# Patient Record
Sex: MALE | Race: WHITE | Employment: FULL TIME | URBAN - METROPOLITAN AREA
[De-identification: names, ages, dates, MRNs, and addresses within clinical notes are randomized per-mention and may not be internally consistent; named-entity substitution may affect disease eponyms.]

---

## 2023-02-24 DIAGNOSIS — M25.471 PAIN AND SWELLING OF RIGHT ANKLE: Primary | ICD-10-CM

## 2023-02-24 DIAGNOSIS — M25.571 PAIN AND SWELLING OF RIGHT ANKLE: Primary | ICD-10-CM

## 2023-02-28 ENCOUNTER — OFFICE VISIT (OUTPATIENT)
Dept: ORTHOPEDIC SURGERY | Age: 33
End: 2023-02-28

## 2023-02-28 VITALS — WEIGHT: 315 LBS | HEIGHT: 77 IN | TEMPERATURE: 98 F | BODY MASS INDEX: 37.19 KG/M2

## 2023-02-28 DIAGNOSIS — S93.491A SPRAIN OF OTHER LIGAMENT OF RIGHT ANKLE, INITIAL ENCOUNTER: Primary | ICD-10-CM

## 2023-02-28 RX ORDER — SERTRALINE HYDROCHLORIDE 100 MG/1
TABLET, FILM COATED ORAL
COMMUNITY
Start: 2023-01-30

## 2023-02-28 RX ORDER — LOSARTAN POTASSIUM AND HYDROCHLOROTHIAZIDE 12.5; 1 MG/1; MG/1
TABLET ORAL
COMMUNITY
Start: 2022-12-08

## 2023-02-28 SDOH — HEALTH STABILITY: PHYSICAL HEALTH: ON AVERAGE, HOW MANY DAYS PER WEEK DO YOU ENGAGE IN MODERATE TO STRENUOUS EXERCISE (LIKE A BRISK WALK)?: 6 DAYS

## 2023-02-28 SDOH — HEALTH STABILITY: PHYSICAL HEALTH: ON AVERAGE, HOW MANY MINUTES DO YOU ENGAGE IN EXERCISE AT THIS LEVEL?: 150+ MIN

## 2023-02-28 NOTE — PROGRESS NOTES
Chief Complaint   Patient presents with    Ankle Pain     Right ankle/foot injury 1/20/23. Was at work and stepped off a fork lift and rolled it. Molly Izaguirre is a 28 y.o.  male who presents today with a right ankle injury. The injury occurred 1/20/23. The history of injury was from at work. The patient complains of pain over lateral ankle. The intensity is 4/10. The pain is described as: sharp. Previous treatment includes: boot,crutches,has been weight bearing. No past medical history on file. No past surgical history on file. Current Outpatient Medications:     losartan-hydroCHLOROthiazide (HYZAAR) 100-12.5 MG per tablet, TAKE 1 TABLET BY MOUTH EVERY DAY, Disp: , Rfl:     sertraline (ZOLOFT) 100 MG tablet, TAKE 1 TABLET BY MOUTH EVERY DAY, Disp: , Rfl:   No Known Allergies  Social History     Socioeconomic History    Marital status:      Spouse name: Not on file    Number of children: Not on file    Years of education: Not on file    Highest education level: Not on file   Occupational History    Not on file   Tobacco Use    Smoking status: Not on file    Smokeless tobacco: Not on file   Substance and Sexual Activity    Alcohol use: Not on file    Drug use: Not on file    Sexual activity: Not on file   Other Topics Concern    Not on file   Social History Narrative    Not on file     Social Determinants of Health     Financial Resource Strain: Not on file   Food Insecurity: Not on file   Transportation Needs: Not on file   Physical Activity: Sufficiently Active    Days of Exercise per Week: 6 days    Minutes of Exercise per Session: 150+ min   Stress: Not on file   Social Connections: Not on file   Intimate Partner Violence: Not At Risk    Fear of Current or Ex-Partner: No    Emotionally Abused: No    Physically Abused: No    Sexually Abused: No   Housing Stability: Not on file     No family history on file.     REVIEW OF SYSTEMS:     General/Constitution:  (-)weight loss, (-)fever, (-)chills, (-)weakness. Skin: (-) rash,(-) psoriasis,(-) eczema, (-)skin cancer. Musculoskeletal: (-) fractures,  (-) dislocations,(-) collagen vascular disease, (-) fibromyalgia, (-) multiple sclerosis, (-) muscular dystrophy, (-) RSD,(-) joint pain (-)swelling, (-) joint pain,swelling. Neurologic: (-) epilepsy, (-)seizures,(-) brain tumor,(-) TIA, (-)stroke, (-)headaches, (-)Parkinson disease,(-) memory loss, (-) LOC. Cardiovascular: (-) Chest pain, (-) swelling in legs/feet, (-) SOB, (-) cramping in legs/feet with walking. Respiratory: (-) SOB, (-) Coughing, (-) night sweats. GI: (-) nausea, (-) vomiting, (-) diarrhea, (-) blood in stool, (-) gastric ulcer. Psychiatric: (-) Depression, (-) Anxiety, (-) bipolar disease, (-) Alzheimer's Disease  Allergic/Immunologic: (-) allergies latex, (-) allergies metal, (-) skin sensitivity. Hematlogic: (-) anemia, (-) blood transfusion, (-) DVT/PE, (-) Clotting disorders      EXAM:      Constitution:    Temp 98 °F (36.7 °C)   Ht 6' 5\" (1.956 m)   Wt (!) 350 lb (158.8 kg)   BMI 41.50 kg/m²       Psycihatric:    The patient is alert and oriented x 3, appears to be stated age and in no distress. Respiratory:    Respiratory effort is not labored. Patient is not gasping. Palpation of the chest reveals no tactile fremitus. Skin:    Upon inspection: the skin appears warm, dry and intact. There is not a previous scar over the affected area. There is not any cellulitis, lymphedema or cutaneous lesions noted in the lower extremities. Upon palpation there is no induration noted. Neurologic:      Motor exam of the lower extremities show ; quadriceps, hamstrings, foot dorsi and plantar flexors intact R.  5/5 and L. 5/5. Deep tendon reflexes are 2/4 at the knees and 2/4 at the ankles with strong extensor hallicus longus motor strength bilaterally. Sensory to both feet is intact to all sensory roots. Cardiovascular:     The vascular exam is normal and is well perfused to distal extremities. Distal pulses DP/PT: R. 2+; L. 2+. There is cap refill noted less than two seconds in all digits. There is not edema of the bilateral lower extremities. There is not varicosities noted in the distal extremities. Lymph:    Upon palpation,  there is no lymphadenopathy noted in bilateral lower extremities. Musculoskeletal:    Gait: antalgic; examination of the nails and digits reveal no cyanosis or clubbing. Knee exam - bilateral knee exam shows;  range of motion of R. Knee is 0 to 120, and L. Knee is 0 to 120. The patient does not have  pain on motion, there is not an effusion, there is not tenderness over the  medial, lateral, anterior region, there are not any masses, there is not ligamentous instability, there is not  deformity noted. Knee exam: the injured knee reveals normal exam, no swelling, tenderness, instability; ligaments intact, FROM. Knee exam: neither positive for moderate crepitations, some mild tenderness laxity is not noted with  stress. Ankle Exam:    Upon inspection and palpation of the Right ankle,  there is  deformity noted,  moderate swelling, no ecchymosis, has pain on palpation of ATFL,CFL. ROM R/L : DF 5/15; PF 30/35;  INV 15/15, NEREIDA 15/15. This exam was compared bilaterally. Right Ankle:   (-) Anterior Drawer ,  (-) Posterior Drawer ,  (-) Squeeze test,  (-) External Rotation, (-) Eversion test , (-) Cueto Test     Left ankle:   (-) Anterior Drawer ,(-)  Posterior Drawer ,(-) Squeeze test,(-) External Rotation (-) Eversion test, (-) Cueto Test.      Foot exam- visual inspection reveals warm, good capillary refill, there is not pain to palpation over the foot. ROM inversion/eversion full range of motion, abduction/adduction full range of motion, ROM in MTP/PIP/DIP full range of motion. XR: no fracture or dislocation      Impression:  Encounter Diagnosis   Name Primary?     Sprain of other ligament of right ankle, initial encounter Yes         Plan:Natural history and expected course discussed. Questions answered. Rest, ice, compression, elevation (RICE) therapy. Crutches and instructions provided. Educational materials distributed.   C9 for PT and ASO  Wbat  Conservative treatment   Fu 4-6 weeks  Ok to continue working from home

## 2023-03-15 ENCOUNTER — EVALUATION (OUTPATIENT)
Dept: PHYSICAL THERAPY | Age: 33
End: 2023-03-15

## 2023-03-15 DIAGNOSIS — S93.401A SPRAIN OF RIGHT ANKLE, UNSPECIFIED LIGAMENT, INITIAL ENCOUNTER: Primary | ICD-10-CM

## 2023-03-15 NOTE — PROGRESS NOTES
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 Pain returns to baseline in  10 minutes  Pain frequency: intermittent    Symptom Type / Quality: dull, aching  Location[de-identified] Ankle: lateral side and posterior side, over ATF and just posterior to lateral malleoli    Aggravated by: walking, stairs    Relieved by: rest, reduced weightbearing    Imaging results: XR ANKLE RIGHT (MIN 3 VIEWS)    Result Date: 2023  EXAMINATION: THREE XRAY VIEWS OF THE RIGHT ANKLE; THREE XRAY VIEWS OF THE RIGHT FOOT 2023 11:39 am COMPARISON: None. HISTORY: ORDERING SYSTEM PROVIDED HISTORY: Pain and swelling of right ankle FINDINGS: Right ankle: Radiographs of the right ankle demonstrate no acute fractures with preserved alignment. Sequela of prior trauma distal to the right fibula and medial malleolus. Ankle mortise is maintained. Normal appearance of the talar dome. Anterior and posterior calcaneal enthesophytes. Osseous mineralization is normal.  Lateral malleolar soft tissue swelling and small joint effusion. Right foot: Radiographs of the right foot demonstrate no fractures with preserved alignment. There are no erosive changes. No degenerative spurring.   Osseous mineralization is normal.  There is no soft tissue swelling. 1.  No acute osseous findings seen about the right ankle nor right foot. Lateral malleolar soft tissue swelling and small joint effusion. 2.  Sequela of prior trauma distal to the medial and lateral malleolus. Noted calcaneal enthesophytes. RECOMMENDATION: In the setting of recent trauma, if there is persistent symptoms and physical exam warrants a repeat radiograph in 10-14 days could be considered as occult fractures may not be evident on initial imaging evaluation. XR FOOT RIGHT (MIN 3 VIEWS)    Result Date: 2/28/2023  EXAMINATION: THREE XRAY VIEWS OF THE RIGHT ANKLE; THREE XRAY VIEWS OF THE RIGHT FOOT 2/28/2023 11:39 am COMPARISON: None. HISTORY: ORDERING SYSTEM PROVIDED HISTORY: Pain and swelling of right ankle FINDINGS: Right ankle: Radiographs of the right ankle demonstrate no acute fractures with preserved alignment. Sequela of prior trauma distal to the right fibula and medial malleolus. Ankle mortise is maintained. Normal appearance of the talar dome. Anterior and posterior calcaneal enthesophytes. Osseous mineralization is normal.  Lateral malleolar soft tissue swelling and small joint effusion. Right foot: Radiographs of the right foot demonstrate no fractures with preserved alignment. There are no erosive changes. No degenerative spurring. Osseous mineralization is normal.  There is no soft tissue swelling. 1.  No acute osseous findings seen about the right ankle nor right foot. Lateral malleolar soft tissue swelling and small joint effusion. 2.  Sequela of prior trauma distal to the medial and lateral malleolus. Noted calcaneal enthesophytes. RECOMMENDATION: In the setting of recent trauma, if there is persistent symptoms and physical exam warrants a repeat radiograph in 10-14 days could be considered as occult fractures may not be evident on initial imaging evaluation. Past Medical History:  No past medical history on file.   No past surgical history on file.    Medications:   Current Outpatient Medications   Medication Sig Dispense Refill    losartan-hydroCHLOROthiazide (HYZAAR) 100-12.5 MG per tablet TAKE 1 TABLET BY MOUTH EVERY DAY      sertraline (ZOLOFT) 100 MG tablet TAKE 1 TABLET BY MOUTH EVERY DAY       No current facility-administered medications for this visit.       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 Physical demands include: lifting, carrying, pushing, pulling heavy weighted items (50 - 100 lbs Occasionally), walking.  Status: full time 8hr shifts/6 days week    Exercise regimen: none    Hobbies: golfing    Patient Goals: full use of leg, get back to normal, return to work    Precautions/Contraindications: none    OBJECTIVE:     Estimated body mass index is 41.5 kg/m² as calculated from the following:    Height as of 2/28/23: 6' 5\" (1.956 m).    Weight as of 2/28/23: 350 lb (158.8 kg).     Observations: well nourished male    Inspection: normal orthopedic exam    Edema: marked  lateral maleolli  over ATF    Gait: antalgic gait, wide-based    Joint/Motion:    Ankle:  Right:   AROM: 0° Dorsiflexion,  30° Plantarflexion, 25° Inversion, 10° Eversion   PROM: 8° Dorsiflexion,  35° Plantarflexion, 30° Inversion, 15° Eversion   Left:   AROM: WNL  PROM: WNL    Strength:    Ankle:  Right: Dorsiflexion 5-/5, Plantarflexion 5-/5, Inversion 4/5, Eversion 4/5  Left: Dorsiflexion 5/5, Plantarflexion 5/5, Inversion 5/5, Eversion 5/5    Palpation: Tender to palpation anterior talofibular ligament , posterior talofibular ligament .    Special Tests/Functional Screens:    [x] Anterior Drawer []+ / [x] -  [x] Eversion Stress: []+ / [x] -  [] Cueto Test []+ / [] -     [] Tib-Fib Compression Test []+ / [] -    [x] Inversion Stress []+ / [x] -     [] Squeeze Test []+ / [] -   [x] Benoit's Sign []+ / [x] -   [] Other: []+ / []      Special test comments:       ASSESSMENT     Phillip sustained inversion ankle sprain 1-. He was in a boot and ambulated NWB w/ crutches. He is  improving and ambulates w/ slight antalgic gait w/ excessive inversion on the R vs L. He does have decreased ROM also. We will initiate a ROM, stretching, strengthening, and proprioceptive program.    Outcome Measure:   Lower Extremity Functional Scale (LEFS) 49% impairment    Problems:   Pain 10/10 intermittent  Edema marked  ROM decreased  Strength decreased   Limitations with use of right lower extremity, walking, stairs, work      [x] There are no barriers affecting plan of care or recovery    [] Barriers to this patient's plan of care or recovery include:     Domestic Concerns:  [x] No  [] Yes:    Short Term goals (2-3 weeks)  Pain 5/10 intermittent  Edema abolished  Able to perform / complete the following functions / tasks: normal gait    Long Term goals (4-6 weeks)  Pain 1/10 infrequent  ROM symmetrical  Strength 5/5  Able to perform / complete the following functions / tasks: normal gait, normal stair negotiation , return to exercise regimen   LEFS 15% impairment  Independent with home exercise program (HEP)    Rehab Potential: [x] Good  [] Fair  [] Poor    PLAN       Physical therapy plan of care is established based on physician order, patient diagnosis, and clinical assessment.     Treatment Plan:   instruction in home exercise program   therapeutic exercise   therapeutic activity   neuromuscular re-education   gait training   proprioceptive training   soft tissue mobilization   vasocompression     The following CPT codes are likely to be used in the care of this patient:   93768 PT Evaluation: Moderate Complexity   97233 Therapeutic Exercise   36376 Neuromuscular Re-Education   32352 Therapeutic Activities   22548 Manual Therapy   66478 Gait Training   44043 Vasopneumatic Device     Suggested Professional Referral: [x] No  [] Yes:     Patient Education:  [x] Plans / Goals, Risks / Benefits discussed  [x] Home exercise program  Method of Education: [x] Verbal  [x] Demo  [x] Written  Comprehension of Education:  [x] Verbalizes understanding. [x] Demonstrates understanding. [] Needs Review. [] Demonstrates / verbalizes understanding of HEP/Ed previously given. Frequency: 1-2 days per week for 4-6 weeks    Patient understands diagnosis/prognosis and consents to treatment, plan and goals: [x] Yes    [] No     Thank you for the opportunity to work with your patient. If you have questions or comments, please contact me at [phone]; fax: [phone]. Electronically signed by: Lizz Yanez PT         Please sign Physician's Certification and return to: [address] PHYSICAL THERAPY  [address] [facility] [address]  Dept: [phone]  Dept Fax: [phone]  Loc: [phone] Certification / Comments     Frequency/Duration 1-2 days per week for 4-6 weeks. Certification period from 3/15/2023 to 6/10/2023. I have reviewed the Plan of Care established for skilled therapy services and certify that the services are required and that they will be provided while the patient is under my care.     Physician's Comments/Revisions:               Physician's Printed Name:                                           [de-identified] Signature:                                                               Date:

## 2023-03-15 NOTE — PROGRESS NOTES
Physical Therapy Daily Treatment Note    Date: 3/15/2023  Patient Name: Adi Jo  : 1990   MRN: [id number]  DOInjury: 2023   DOSx: -  Referring Provider: Nancy Glass, [address] [place] [address]     Medical Diagnosis:     J71.573N (ICD-10-CM) - Sprain of unspecified ligament of right ankle, initial encounter    Outcome Measure:  LEFS 49% impairment    Vernadine Baumgarten sustained inversion ankle sprain 2023. He was in a boot and ambulated NWB w/ crutches. He is improving and ambulates w/ slight antalgic gait w/ excessive inversion on the R vs L. He does have decreased ROM also. We will initiate a ROM, stretching, strengthening, and proprioceptive program.    X = TO BE PERFORMED NEXT VISIT  > = PROGRESS TO THIS    S: See eval  O: Discussed anatomy, physiology, body mechanics, principles of loading, and progressive loading/activity. Reviewed home exercise program extensively; written copy provided. Time 9547-5424     Visit 1 Repeat outcome measure at mid point and end. Pain Pain with activity 3/10     ROM Limited dorsiflexion and plantarflexion     Modalities         MO   Manual            Stretch      Towel / strap DF, INV, EV   TE      TE   Exercise      Ankle pumps 2 x 50  TE   Ankle circles CW/CCW 2 x 50        TE   QS   TE   SLR   TE   SAQ   TE   [] TG  [] Leg Press 2-leg   TE   [] TG  [] Leg Press 1-leg   TE   Hamstring Curl Machine   TE   Knee Extension Machine   TE   Step-ups - FWD   TA   Step-ups - LAT   TA   Step-ups - BWD   TA   Calf Raises   TA      TA      TA               A:  Tolerated well.     P: Continue with rehab plan  Yane Morris PT    Treatment Charges: Mins Units   Initial Evaluation 30 1   Re-Evaluation     Ther Exercise         TE 15 1   Manual Therapy     MT     Ther Activities        TA     Gait Training          GT     Neuro Re-education NR     Modalities     Non-Billable Service Time     Other     Total Time/Units 45 2

## 2023-03-16 PROBLEM — S93.401A SPRAIN OF RIGHT ANKLE: Status: ACTIVE | Noted: 2023-03-16

## 2023-03-20 ENCOUNTER — TREATMENT (OUTPATIENT)
Dept: PHYSICAL THERAPY | Age: 33
End: 2023-03-20
Payer: COMMERCIAL

## 2023-03-20 DIAGNOSIS — S93.401A SPRAIN OF RIGHT ANKLE, UNSPECIFIED LIGAMENT, INITIAL ENCOUNTER: Primary | ICD-10-CM

## 2023-03-20 PROCEDURE — 97530 THERAPEUTIC ACTIVITIES: CPT

## 2023-03-20 NOTE — PROGRESS NOTES
Physical Therapy Daily Treatment Note    Date: 3/20/2023  Patient Name: Judy Aragon  : 1990   MRN: [id number]  DOInjury: 23   DOSx: n/a  Referring Provider: Leslye Mann DO   [address]  [facility]     Medical Diagnosis:      Diagnosis Orders   1. Sprain of right ankle, unspecified ligament, initial encounter            Outcome Measure:  LEFS 49% impairment    X = TO BE PERFORMED NEXT VISIT  > = PROGRESS TO THIS    S: Pt reports no pain at rest but 3-4/10 during activity. O: Discussed anatomy, physiology, body mechanics, principles of loading, and progressive loading/activity. Reviewed home exercise program extensively; written copy provided. Time 8946-9660     Visit 2/ Repeat outcome measure at mid point and end. Pain Pain at rest 0/10  Pain with activity 4/10     ROM      Modalities         MO   Manual            Stretch      Towel / strap DF, INV, EV   TE      TE   Exercise      BAPS L3 x 20 1 foot on board  L3 x 20 2 feet on board     Ball / can rolling   TE   Toe curls      Heel slides   TE   QS   TE   SLR   TE   SAQ   TE   [] TG  [] Leg Press 2-leg   TE   [] TG  [] Leg Press 1-leg   TE   Hamstring Curl Machine   TE   Knee Extension Machine   TE   Step-ups - FWD 7\" x 20  TA   Step-ups - LAT 7\" x 20  TA   Step-ups - BWD   TA   Calf Raises 3 x 10  TA      TA   Marching 2 x 50 ft  TA   Side marching 2 x 50ft           A:  Tolerated well.     P: Continue with rehab plan  Yossi Mcghee PTA    Treatment Charges: Mins Units   Initial Evaluation     Re-Evaluation     Ther Exercise         TE     Manual Therapy     MT     Ther Activities        TA 35 2   Gait Training          GT     Neuro Re-education NR     Modalities     Non-Billable Service Time     Other     Total Time/Units 35 2

## 2023-03-22 ENCOUNTER — TREATMENT (OUTPATIENT)
Dept: PHYSICAL THERAPY | Age: 33
End: 2023-03-22

## 2023-03-22 DIAGNOSIS — S93.401A SPRAIN OF RIGHT ANKLE, UNSPECIFIED LIGAMENT, INITIAL ENCOUNTER: Primary | ICD-10-CM

## 2023-03-22 NOTE — PROGRESS NOTES
Physical Therapy Daily Treatment Note    Date: 3/22/2023  Patient Name: Nga Manley  : 1990   MRN: [id number]  DOInjury: 23   DOSx: n/a  Referring Provider: Saranya Hare DO   [address]  [facility]     Medical Diagnosis:      Diagnosis Orders   1. Sprain of right ankle, unspecified ligament, initial encounter              Outcome Measure:  LEFS 49% impairment    X = TO BE PERFORMED NEXT VISIT  > = PROGRESS TO THIS    S: Pt reports overall tightness in ankle, soreness. Pt reports increased pain following last session but subsided by late evening. 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 O: Moderate swelling noted in right lateral ankle  Time 0438-6766     Visit 3/ Repeat outcome measure at mid point and end. Pain Pain at rest 0/10  Pain with activity 4/10     ROM      Modalities         MO   Manual            Stretch      Towel / strap DF, INV, EV   TE   Gastroc wall stretch 3 x 30 sec     Soleus wall stretch 3 x 30 sec     Plantar flexion wall stretch 3 x 30 sec  TE   Exercise      Treadmill 1.0 mph x 5 min     BAPS L3 2 x 20   2 feet on board Walk between sets    Ball / can rolling   TE   Toe curls      Heel slides   TE   QS   TE   SLR   TE   SAQ   TE   [] TG  [] Leg Press 2-leg   TE   [] TG  [] Leg Press 1-leg   TE   Hamstring Curl Machine   TE   Knee Extension Machine   TE   Step-ups - FWD  TA   Step-ups - LAT  TA   Step-ups - BWD   TA   Calf Raises  TA      TA   Marching 4 x 50 ft  TA   Side marching           A: Tolerated well. Felt better following PT. Decreased discomfort and tightness.   P: Continue with rehab plan  Manav Osuna PTA    Treatment Charges: Mins Units   Initial Evaluation     Re-Evaluation     Ther Exercise         TE 25 2   Manual Therapy     MT     Ther Activities        TA 15 1   Gait Training          GT     Neuro Re-education NR     Modalities     Non-Billable Service Time

## 2023-03-24 ENCOUNTER — TREATMENT (OUTPATIENT)
Dept: PHYSICAL THERAPY | Age: 33
End: 2023-03-24

## 2023-03-24 DIAGNOSIS — S93.401A SPRAIN OF RIGHT ANKLE, UNSPECIFIED LIGAMENT, INITIAL ENCOUNTER: Primary | ICD-10-CM

## 2023-03-28 ENCOUNTER — OFFICE VISIT (OUTPATIENT)
Dept: ORTHOPEDIC SURGERY | Age: 33
End: 2023-03-28
Payer: COMMERCIAL

## 2023-03-28 ENCOUNTER — TREATMENT (OUTPATIENT)
Dept: PHYSICAL THERAPY | Age: 33
End: 2023-03-28

## 2023-03-28 VITALS — WEIGHT: 315 LBS | TEMPERATURE: 98 F | HEIGHT: 77 IN | BODY MASS INDEX: 37.19 KG/M2

## 2023-03-28 DIAGNOSIS — S93.491A SPRAIN OF OTHER LIGAMENT OF RIGHT ANKLE, INITIAL ENCOUNTER: Primary | ICD-10-CM

## 2023-03-28 DIAGNOSIS — S93.401A SPRAIN OF RIGHT ANKLE, UNSPECIFIED LIGAMENT, INITIAL ENCOUNTER: Primary | ICD-10-CM

## 2023-03-28 PROCEDURE — 99213 OFFICE O/P EST LOW 20 MIN: CPT | Performed by: ORTHOPAEDIC SURGERY

## 2023-03-28 RX ORDER — CELECOXIB 200 MG/1
200 CAPSULE ORAL DAILY
Qty: 30 CAPSULE | Refills: 3 | Status: SHIPPED | OUTPATIENT
Start: 2023-03-28 | End: 2023-07-26

## 2023-03-28 NOTE — PROGRESS NOTES
Physical Therapy Daily Treatment Note    Date: 3/28/2023  Patient Name: Benito Moffett  : 1990   MRN: [id number]  DOInjury: 23   DOSx: n/a  Referring Provider: Arturo Ashton DO   [address]  [facility]     Medical Diagnosis:      Diagnosis Orders   1. Sprain of right ankle, unspecified ligament, initial encounter          Outcome Measure:  LEFS 49% impairment    LEFS 49% impairment (3/28/23)    X = TO BE PERFORMED NEXT VISIT  > = PROGRESS TO THIS    S: Pt reports consistent 3-4/10 pain and tightness. Does increase with activity  O: Moderate swelling noted in right lateral ankle  Time 0608-7066     Visit   Claim # [id number]  Dx: J79.922R  DOI: 2023  Wc approved 18 visits through 2023 Repeat outcome measure at mid point and end. Pain Pain with activity 5/10  Pain 3-4/10 at rest     ROM      Modalities         MO   Manual            Stretch      Towel / strap DF, INV, EV   TE   Gastroc wall stretch 3 x 30 sec     Soleus wall stretch 3 x 30 sec     Plantar flexion wall stretch 3 x 30 sec  TE   Stretch on step gastroc  3 x 30 sec     Exercise      Treadmill 1.9 mph x 5 min     BAPS L3 2 x 20   2 feet on board Walk between sets    Ball / can rolling   TE   Toe curls      Heel slides   TE   QS   TE   SLR   TE   SAQ   TE   [] TG  [] Leg Press 2-leg   TE   [] TG  [] Leg Press 1-leg   TE   Hamstring Curl Machine   TE   Knee Extension Machine   TE   Step-ups - FWD 7\" x 10  TA   Step-ups - LAT 7\" x 10  TA   Step-ups - BWD   TA   Calf Raises  TA   Slant board  TA   prostretch     Marching 4 x 50 ft  TA   Side marching           A: Tolerated well.  LEFs outcome measure remains the same  P: Continue with rehab plan  Marlee Holden PTA    Treatment Charges: Mins Units   Initial Evaluation     Re-Evaluation     Ther Exercise         TE 20 1   Manual Therapy     MT     Ther Activities        TA 20 1   Gait Training          GT     Neuro Re-education NR     Modalities     Non-Billable

## 2023-03-28 NOTE — PROGRESS NOTES
instructions provided. Educational materials distributed. He will continue with PT. I will give him an ASO and see him back 5 weeks.   He will continue to work from home until next orthopedic evaluation on 05/02/2023

## 2023-03-29 ENCOUNTER — TREATMENT (OUTPATIENT)
Dept: PHYSICAL THERAPY | Age: 33
End: 2023-03-29
Payer: COMMERCIAL

## 2023-03-29 DIAGNOSIS — S93.401A SPRAIN OF RIGHT ANKLE, UNSPECIFIED LIGAMENT, INITIAL ENCOUNTER: Primary | ICD-10-CM

## 2023-03-29 PROCEDURE — 97530 THERAPEUTIC ACTIVITIES: CPT

## 2023-03-29 PROCEDURE — 97110 THERAPEUTIC EXERCISES: CPT

## 2023-03-29 NOTE — PROGRESS NOTES
Physical Therapy Daily Treatment Note    Date: 3/29/2023  Patient Name: Azam Mustafa  : 1990   MRN: [id number]  DOInjury: 23   DOSx: n/a  Referring Provider: Eloy Boeck, DO   [address]  [facility]     Medical Diagnosis:      Diagnosis Orders   1. Sprain of right ankle, unspecified ligament, initial encounter            Outcome Measure:  LEFS 49% impairment    LEFS 49% impairment (3/28/23)    X = TO BE PERFORMED NEXT VISIT  > = PROGRESS TO THIS    S: Pt states when he stretches ankle feels better/looser but increased activity tightens it right back up. O: Moderate swelling noted in right lateral ankle. Noted decreased antalgic gait this session  Time      Visit   Claim # [id number]  Dx: S36.062X  DOI: 2023  Wc approved 18 visits through 2023 Repeat outcome measure at mid point and end. Pain Pain with activity 5/10  Pain 3-4/10 at rest     ROM      Modalities         MO   Manual            Stretch      Towel / strap DF, INV, EV   TE   Gastroc wall stretch     Soleus step stretch 3 x 30 sec     Plantar flexion wall stretch 3 x 30 sec  TE   Stretch on step gastroc  3 x 30 sec     Exercise      Treadmill 1.9 mph x 5 min     BAPS L3 2 x 20   2 feet on board Walk between sets    Ball / can rolling   TE   Toe curls      Heel slides   TE   QS   TE   SLR   TE   SAQ   TE   [] TG  [] Leg Press 2-leg NEXT  TE   [] TG  [] Leg Press 1-leg   TE   Hamstring Curl Machine   TE   Knee Extension Machine   TE   Step-ups - FWD 7\" x 20  TA   Step-ups - LAT 7\" x 20  TA   Step-ups - BWD   TA   Heel touches NEXT     Calf Raises  TA   Balance 3 x 30 sec     Marching 4 x 50 ft  TA   Side marching 2 x 50ft           A: Tolerated well.  No new or significant changes this session  P: Continue with rehab plan  Ross Avendano PTA    Treatment Charges: Mins Units   Initial Evaluation     Re-Evaluation     Ther Exercise         TE 20 1   Manual Therapy     MT     Ther Activities        TA

## 2023-03-31 ENCOUNTER — TREATMENT (OUTPATIENT)
Dept: PHYSICAL THERAPY | Age: 33
End: 2023-03-31

## 2023-03-31 DIAGNOSIS — S93.401A SPRAIN OF RIGHT ANKLE, UNSPECIFIED LIGAMENT, INITIAL ENCOUNTER: Primary | ICD-10-CM

## 2023-04-03 ENCOUNTER — TREATMENT (OUTPATIENT)
Dept: PHYSICAL THERAPY | Age: 33
End: 2023-04-03

## 2023-04-03 DIAGNOSIS — S93.401A SPRAIN OF RIGHT ANKLE, UNSPECIFIED LIGAMENT, INITIAL ENCOUNTER: Primary | ICD-10-CM

## 2023-04-03 NOTE — PROGRESS NOTES
Physical Therapy Daily Treatment Note    Date: 4/3/2023  Patient Name: Conrado Callejas  : 1990   MRN: [id number]  DOInjury: 23   DOSx: n/a  Referring Provider: Lita Wilson DO  [address]  [facility]     Medical Diagnosis:      Diagnosis Orders   1. Sprain of right ankle, unspecified ligament, initial encounter                Outcome Measure:  LEFS 49% impairment    LEFS 49% impairment (3/28/23)    X = TO BE PERFORMED NEXT VISIT  > = PROGRESS TO THIS    S: Pt states ankle is sore today. States dealing with power outage. Carrying, lifting different stuff to get it off floor, stuff out of refrigerator. O: Moderate swelling noted in right lateral ankle. Noted decreased antalgic gait this session  Time      Visit   Claim # [id number]  Dx: N98.998U  DOI: 2023   approved 18 visits through 2023 Repeat outcome measure at mid point and end. Pain Pain with activity 5/10  Pain 3-4/10 at rest     ROM      Modalities         MO   Manual            Stretch      Towel / strap DF, INV, EV   TE   Gastroc wall stretch     Soleus step stretch 3 x 30 sec     Plantar flexion wall stretch 3 x 30 sec  TE   Stretch on step gastroc  3 x 30 sec     Exercise      Treadmill 1.9 mph x 5 min     BAPS Walk between [employer] / can rolling   TE   Toe curls      Heel slides   TE   QS   TE   SLR   TE   SAQ   TE   [] TG  [] Leg Press 2-leg   TE   [] TG  [x] Leg Press 1-leg 60# 2 x 15  TE   Hamstring Curl Machine   TE   Knee Extension Machine   TE   Step-ups - FWD 14\" 2 x 15  TA   Step-ups - LAT  TA   Step-ups - BWD   TA   Lunges NEXT     Heel touches     Calf Raises SL 2 x 15  TA   Balance     Marching 4 x 50 ft  TA   Side marching 2 x 50ft           A: Tolerated well. Soleus calf stretch increases anterior ankle pain.   P: Continue with rehab plan  David Krause PTA    Treatment Charges: Mins Units   Initial Evaluation     Re-Evaluation     Ther Exercise         TE     Manual Therapy

## 2023-04-07 ENCOUNTER — TELEPHONE (OUTPATIENT)
Dept: PHYSICAL THERAPY | Age: 33
End: 2023-04-07

## 2023-04-07 NOTE — TELEPHONE ENCOUNTER
Date: 2023       Patient Name: Ruby Jaime  : 1990      MRN: [id number]    Patient cancelled appt today.  He is having car trouble    Subha Wilde PTA

## 2023-04-17 ENCOUNTER — TREATMENT (OUTPATIENT)
Dept: PHYSICAL THERAPY | Age: 33
End: 2023-04-17
Payer: COMMERCIAL

## 2023-04-17 DIAGNOSIS — S93.401A SPRAIN OF RIGHT ANKLE, UNSPECIFIED LIGAMENT, INITIAL ENCOUNTER: Primary | ICD-10-CM

## 2023-04-17 PROCEDURE — 97530 THERAPEUTIC ACTIVITIES: CPT

## 2023-04-17 NOTE — PROGRESS NOTES
symptoms.  Will monitor response  P: Continue with rehab plan  Harriet Zamarripa PTA    Treatment Charges: Mins Units   Initial Evaluation     Re-Evaluation     Ther Exercise         TE     Manual Therapy     MT     Ther Activities        TA 35 2   Gait Training          GT     Neuro Re-education NR     Modalities     Non-Billable Service Time 10 0   Other     Total Time/Units 45 2

## 2023-04-19 ENCOUNTER — TREATMENT (OUTPATIENT)
Dept: PHYSICAL THERAPY | Age: 33
End: 2023-04-19

## 2023-04-19 DIAGNOSIS — S93.401A SPRAIN OF RIGHT ANKLE, UNSPECIFIED LIGAMENT, INITIAL ENCOUNTER: Primary | ICD-10-CM

## 2023-04-19 NOTE — PROGRESS NOTES
Physical Therapy Daily Treatment Note    Date: 2023  Patient Name: Alexsandra Fulton  : 1990   MRN: [id number]  DOInjury: 23   DOSx: n/a  Referring Provider: Jun Abel DO   [address]  [facility]     Medical Diagnosis:      Diagnosis Orders   1. Sprain of right ankle, unspecified ligament, initial encounter            Outcome Measure:  LEFS 49% impairment    LEFS 49% impairment (3/28/23)    X = TO BE PERFORMED NEXT VISIT  > = PROGRESS TO THIS    S: Pt reports ankle feels the same today. States has been improving but now at a stand still  O: Moderate swelling noted in right lateral ankle. Normal gait at start of PT, slight limp by end. Time 3372-3747     Visit   Claim # [id number]  Dx: R41.279M  DOI: 2023  Wc approved 18 visits through 2023 Repeat outcome measure at mid point and end. Pain Pain at rest 5/10  Pain with activity 7/10      ROM      Modalities         MO   Manual            Stretch      Towel / strap DF, INV, EV   TE   Gastroc wall stretch     Soleus step stretch 3 x 30 sec     Plantar flexion stretch on step 3 x 30 sec  TE   Stretch on step gastroc      Exercise      Treadmill     BAPS Walk between [employer] / can rolling   TE   Toe curls      Heel slides   TE   QS   TE   SLR   TE   SAQ   TE   [] TG  [] Leg Press 2-leg   TE   [] TG  [x] Leg Press 1-leg  TE   Hamstring Curl Machine   TE   Knee Extension Machine   TE   Step-ups - FWD 14\" 2 x 15  TA   Step-ups - LAT  TA   Step-ups - BWD   TA   Lunges     Heel touches     Soleus calf raises     Calf Raises SL 2 x 15  TA   Balance     Marching  No hand hold    Marching 15#DB (2) 2 x 50 ft  TA   Side marching 15#DB (2) 2 x 50ft           Reach and pull row staggered stance 60# 2 x 10 ea LE forward     Diagonal pull down Grey 2 x 15 ea side      pull 35# 2 x 10 ea LE           A: Tolerated fair. Continued with work simulation activities.  Working on weight shifting, lateral and diagonal

## 2023-04-21 ENCOUNTER — TREATMENT (OUTPATIENT)
Dept: PHYSICAL THERAPY | Age: 33
End: 2023-04-21

## 2023-04-21 DIAGNOSIS — S93.401A SPRAIN OF RIGHT ANKLE, UNSPECIFIED LIGAMENT, INITIAL ENCOUNTER: Primary | ICD-10-CM

## 2023-04-21 NOTE — PROGRESS NOTES
Physical Therapy Daily Treatment Note    Date: 2023  Patient Name: Wilfrido Mccord  : 1990   MRN: [id number]  DOInjury: 23   DOSx: n/a  Referring Provider: Dionisio Rankin DO  [address]  [facility]     Medical Diagnosis:      Diagnosis Orders   1. Sprain of right ankle, unspecified ligament, initial encounter              Outcome Measure:  LEFS 49% impairment    LEFS 49% impairment (3/28/23)    X = TO BE PERFORMED NEXT VISIT  > = PROGRESS TO THIS    S: Pt reports ankle was sore into Thursday afternoon (day and a 1/2) following last session with addition of twisting and lateral weighted movements. Feels ok today  O: Moderate swelling noted in right lateral ankle. Normal gait at start of PT, slight limp by end. Time 2163-5451     Visit   Claim # [id number]  Dx: G34.098G  DOI: 2023   approved 18 visits through 2023 Repeat outcome measure at mid point and end. Pain Pain at rest 5/10  Pain with activity 7/10      ROM      Modalities         MO   Manual            Stretch      Towel / strap DF, INV, EV   TE   Gastroc wall stretch     Soleus step stretch 3 x 30 sec     Plantar flexion stretch on step 3 x 30 sec  TE   Stretch on step gastroc      Exercise      Treadmill     BAPS Walk between [employer] / can rolling   TE   Toe curls      Heel slides   TE   QS   TE   SLR   TE   SAQ   TE   [] TG  [] Leg Press 2-leg   TE   [] TG  [x] Leg Press 1-leg  TE   Hamstring Curl Machine   TE   Knee Extension Machine   TE   Step-ups - FWD 14\" 3 x 20  TA   Step-ups - LAT  TA   Step-ups - BWD   TA   Lunges Mini 3 x 10 onto [employer]     Heel touches     Soleus calf raises     Calf Raises SL 3 x 10  TA   Balance     Marching  3 x 20 ea LE [employer] No hand hold    Marching 15#DB (2) 2 x 50 ft  TA   Side marching 15#DB (2) 2 x 50ft           Reach and pull row staggered stance     Diagonal pull down     [employer] pull           A: Tolerated fair. Continued with work simulation activities.

## 2023-04-24 ENCOUNTER — TREATMENT (OUTPATIENT)
Dept: PHYSICAL THERAPY | Age: 33
End: 2023-04-24

## 2023-04-24 DIAGNOSIS — S93.401A SPRAIN OF RIGHT ANKLE, UNSPECIFIED LIGAMENT, INITIAL ENCOUNTER: Primary | ICD-10-CM

## 2023-04-24 NOTE — PROGRESS NOTES
Physical Therapy Daily Treatment Note    Date: 2023  Patient Name: Malachi Hsu  : 1990   MRN: [id number]  DOInjury: 23   DOSx: n/a  Referring Provider: Sybil Severance, DO   [address]  [facility]     Medical Diagnosis:      Diagnosis Orders   1. Sprain of right ankle, unspecified ligament, initial encounter                Outcome Measure:  LEFS 49% impairment    LEFS 49% impairment (3/28/23)    X = TO BE PERFORMED NEXT VISIT  > = PROGRESS TO THIS    S: Pt reports decreased pain (3/10) this weekend/. Pain does elevate with therapy. O: Moderate swelling noted in right lateral ankle. Normal gait at start of PT, slight limp by end. Time 6088-1182     Visit 15/18  Claim # [id number]  Dx: B86.267H  DOI: 2023  Wc approved 18 visits through 2023 Repeat outcome measure at mid point and end. Pain Pain at rest 3/10  Pain with activity 7/10      ROM      Modalities         MO   Manual            Stretch      Towel / strap DF, INV, EV   TE   Gastroc wall stretch     Soleus step stretch 3 x 30 sec     Plantar flexion stretch on step 3 x 30 sec  TE   Stretch on step gastroc      Exercise      Treadmill     BAPS Walk between [employer] / can rolling   TE   Toe curls      Heel slides   TE   QS   TE   SLR   TE   SAQ   TE   [] TG  [] Leg Press 2-leg   TE   [] TG  [x] Leg Press 1-leg  TE   Hamstring Curl Machine   TE   Knee Extension Machine   TE   Step-ups - FWD 14\" 3 x 20  TA   Step-ups - LAT  TA   Step-ups - BWD   TA   Lunges     Heel touches     Soleus calf raises     Calf Raises SL 3 x 10  TA   Balance     Marching  No hand hold    Marching in hallway 15#DB (2) 2 x 50ft  TA   Side marching 15#DB (2) 2 x 50ft           Work Sim activities      Punches 35# 2 x 10 ea LE     Reach and pull row staggered stance     Diagonal pull down     [employer] pull 35# 2 x 10 ea LE           A: Tolerated fair. Continued with work simulation activities.  Working on weight shifting, lateral and

## 2023-04-26 ENCOUNTER — TREATMENT (OUTPATIENT)
Dept: PHYSICAL THERAPY | Age: 33
End: 2023-04-26

## 2023-04-26 DIAGNOSIS — S93.401A SPRAIN OF RIGHT ANKLE, UNSPECIFIED LIGAMENT, INITIAL ENCOUNTER: Primary | ICD-10-CM

## 2023-04-26 NOTE — PROGRESS NOTES
Physical Therapy Daily Treatment Note    Date: 2023  Patient Name: Malachi Hsu  : 1990   MRN: [id number]  DOInjury: 23   DOSx: n/a  Referring Provider: Sybil Severance, DO  [address]  [facility]     Medical Diagnosis:      Diagnosis Orders   1. Sprain of right ankle, unspecified ligament, initial encounter                  Outcome Measure:  LEFS 49% impairment    LEFS 49% impairment (3/28/23)    X = TO BE PERFORMED NEXT VISIT  > = PROGRESS TO THIS    S: Pt reports elevated right ankle pain following last session (7/10) lasting approx day and a half, today pain is 3/10. O: Moderate swelling noted in right lateral ankle. Time 9582-0903     Visit   Claim # [id number]  Dx: M27.305N  DOI: 2023  Wc approved 18 visits through 2023 Repeat outcome measure at mid point and end. Pain Pain at rest 3/10  Pain with activity 710      ROM      Modalities         MO   Manual            Stretch      Towel / strap DF, INV, EV   TE   Gastroc wall stretch     Soleus step stretch Throughout session     PF step stretch Throughout session  TE   Stretch on step gastroc      Exercise      Treadmill 1.9 mph x 5 min     BAPS Walk between [employer] / can rolling   TE   Toe curls      Heel slides   TE   QS   TE   SLR   TE   SAQ   TE   [] TG  [] Leg Press 2-leg   TE   [] TG  [x] Leg Press 1-leg  TE   Hamstring Curl Machine   TE   Knee Extension Machine   TE   Step-ups - FWD 14\" 3 x 20  TA   Step-ups - LAT  TA   Step-ups - BWD   TA   Lunges Mini 3 x 10 onto [employer]     Heel touches     Soleus calf raises     Calf Raises  TA   Balance     Marching  X 2 min [employer] No hand hold    Marching in hallway 15#DB (2) 2 x 50ft  TA   Side marching 15#DB (2) 2 x 50ft           Work transOMIC activities      Punches     Reach and pull row staggered stance 45# 2 x 10 ea LE forward     Diagonal pull down     [employer] pull           A: Tolerated fair.  Rotational and weight shifting activities exacerbate

## 2023-04-28 ENCOUNTER — TELEPHONE (OUTPATIENT)
Dept: PHYSICAL THERAPY | Age: 33
End: 2023-04-28

## 2023-05-02 ENCOUNTER — OFFICE VISIT (OUTPATIENT)
Dept: ORTHOPEDIC SURGERY | Age: 33
End: 2023-05-02
Payer: COMMERCIAL

## 2023-05-02 ENCOUNTER — TREATMENT (OUTPATIENT)
Dept: PHYSICAL THERAPY | Age: 33
End: 2023-05-02

## 2023-05-02 VITALS — WEIGHT: 315 LBS | TEMPERATURE: 98 F | HEIGHT: 77 IN | BODY MASS INDEX: 37.19 KG/M2

## 2023-05-02 DIAGNOSIS — S93.491A SPRAIN OF OTHER LIGAMENT OF RIGHT ANKLE, INITIAL ENCOUNTER: Primary | ICD-10-CM

## 2023-05-02 DIAGNOSIS — S93.401A SPRAIN OF RIGHT ANKLE, UNSPECIFIED LIGAMENT, INITIAL ENCOUNTER: Primary | ICD-10-CM

## 2023-05-02 PROCEDURE — 99213 OFFICE O/P EST LOW 20 MIN: CPT | Performed by: ORTHOPAEDIC SURGERY

## 2023-05-02 NOTE — PROGRESS NOTES
Physical Therapy Daily Treatment Note    Date: 2023  Patient Name: Rosemary Reyes  : 1990   MRN: [id number]  DOInjury: 23   DOSx: n/a  Referring Provider: Judy Greene DO   [address]  [facility]     Medical Diagnosis:      Diagnosis Orders   1. Sprain of right ankle, unspecified ligament, initial encounter          Outcome Measure:  LEFS 49% impairment    LEFS 49% impairment (3/28/23)    LEFs 50% impairment (23)  X = TO BE PERFORMED NEXT VISIT  > = PROGRESS TO THIS    S: Pt states right ankle was improving at beginning of PT however he feels progress has plateaued. States had to carry his nephew in a partial run yesterday; ankle was very painful. O: Moderate swelling noted in right lateral ankle. Time 7912-3278     Visit   Claim # [id number]  Dx: M34.981I  DOI: 2023  Wc approved 18 visits through 2023 Repeat outcome measure at mid point and end.     Pain Pain at rest 2/10  Pain with activity 6/10      ROM      Modalities         MO   Manual            Stretch      Towel / strap DF, INV, EV   TE   Gastroc wall stretch     Soleus step stretch Throughout session     PF step stretch Throughout session  TE   Stretch on step gastroc      Exercise      Treadmill 1.9 mph x 5 min     BAPS Walk between [employer] / can rolling   TE   Toe curls      Heel slides   TE   QS   TE   SLR   TE   SAQ   TE   [] TG  [] Leg Press 2-leg   TE   [] TG  [x] Leg Press 1-leg  TE   Hamstring Curl Machine   TE   Knee Extension Machine   TE   Step-ups - FWD 14\" 3 x 20  TA   Step-ups - LAT  TA   Step-ups - BWD   TA   Lunges Mini 3 x 10 onto [employer]     Heel touches     Soleus calf raises     Calf Raises  TA   Balance     Marching  No hand hold    Marching in hallway 15#DB (2) 2 x 50ft  TA   Side marching 15#DB (2) 2 x 50ft           Work Mobile Pulse activities      Punches Add back in    Reach and pull row staggered stance     Diagonal pull down     [employer] pull 30# 2 x 10 ea LE           A:

## 2023-05-02 NOTE — PROGRESS NOTES
Chief Complaint   Patient presents with    Ankle Pain     [facility] f/u right ankle sprain. Patient is in PT but states improvements in the ankle stopped 2 wks ago. Emmanuel Barrow is a 28 y.o.  male who presents today with a right ankle BW injury 1/20/23. He has completed 17/18 PT appointment. He states he feels PT aggrivates his injury. No past medical history on file. No past surgical history on file. Current Outpatient Medications:     celecoxib (CELEBREX) 200 MG capsule, Take 1 capsule by mouth daily, Disp: 30 capsule, Rfl: 3    losartan-hydroCHLOROthiazide (HYZAAR) 100-12.5 MG per tablet, TAKE 1 TABLET BY MOUTH EVERY DAY, Disp: , Rfl:     sertraline (ZOLOFT) 100 MG tablet, TAKE 1 TABLET BY MOUTH EVERY DAY, Disp: , Rfl:   No Known Allergies  Social History     Socioeconomic History    Marital status:      Spouse name: Not on file    Number of children: Not on file    Years of education: Not on file    Highest education level: Not on file   Occupational History    Not on file   Tobacco Use    Smoking status: Not on file    Smokeless tobacco: Not on file   Substance and Sexual Activity    Alcohol use: Not on file    Drug use: Not on file    Sexual activity: Not on file   Other Topics Concern    Not on file   Social History Narrative    Not on file     Social Determinants of Health     Financial Resource Strain: Not on file   Food Insecurity: Not on file   Transportation Needs: Not on file   Physical Activity: Sufficiently Active    Days of Exercise per Week: 6 days    Minutes of Exercise per Session: 150+ min   Stress: Not on file   Social Connections: Not on file   Intimate Partner Violence: Not At Risk    Fear of Current or Ex-Partner: No    Emotionally Abused: No    Physically Abused: No    Sexually Abused: No   Housing Stability: Not on file     No family history on file. REVIEW OF SYSTEMS:     General/Constitution:  (-)weight loss, (-)fever, (-)chills, (-)weakness.    Skin: (-)

## 2023-05-05 ENCOUNTER — TREATMENT (OUTPATIENT)
Dept: PHYSICAL THERAPY | Age: 33
End: 2023-05-05

## 2023-05-05 DIAGNOSIS — S93.401A SPRAIN OF RIGHT ANKLE, UNSPECIFIED LIGAMENT, INITIAL ENCOUNTER: Primary | ICD-10-CM

## 2023-05-05 NOTE — PROGRESS NOTES
fair. Pain and swelling limiting patients ability to progress  P: Last visit per C9  Jimena Go PTA    Treatment Charges: Mins Units   Initial Evaluation     Re-Evaluation     Ther Exercise         TE     Manual Therapy     MT     Ther Activities        TA 35 2   Gait Training          GT     Neuro Re-education NR     Modalities     Non-Billable Service Time 10 0   Other     Total Time/Units 45 2

## 2023-08-01 ENCOUNTER — OFFICE VISIT (OUTPATIENT)
Dept: ORTHOPEDIC SURGERY | Age: 33
End: 2023-08-01
Payer: COMMERCIAL

## 2023-08-01 VITALS — HEIGHT: 77 IN | BODY MASS INDEX: 37.19 KG/M2 | WEIGHT: 315 LBS

## 2023-08-01 DIAGNOSIS — S93.491A SPRAIN OF OTHER LIGAMENT OF RIGHT ANKLE, INITIAL ENCOUNTER: Primary | ICD-10-CM

## 2023-08-01 PROCEDURE — 99213 OFFICE O/P EST LOW 20 MIN: CPT | Performed by: ORTHOPAEDIC SURGERY

## 2023-08-01 NOTE — PROGRESS NOTES
instability, there is not  deformity noted. Knee exam: bilateral positive for moderate crepitations, some mild tenderness laxity is not noted with  stress. Ankle exam:  Upon inspection and palpation of the Right ankle,  there is not deformity noted,  mild swelling, mild ecchymosis, has pain on palpation of lateral ankle. ROM R/L : DF 5/15; PF 30/35;  INV 15/15, NEREIDA 15/15. This exam was compared bilaterally. Right Ankle:   (-) Anterior Drawer ,  (-) Posterior Drawer ,  (-) Squeeze test,  (-) External Rotation, (-) Eversion test , (-) Cueto Test     Left ankle:   (-) Anterior Drawer ,(-)  Posterior Drawer ,(-) Squeeze test,(-) External Rotation (-) Eversion test, (-) Cueto Test.    Foot exam:  visual inspection reveals warm, good capillary refill and blanching with elevation noted, there is not pain to palpation over the foot. ROM inversion/eversion full range of motion, abduction/adduction full range of motion, ROM in MTP/PIP/DIP full range of motion. X-Ray:  none  Radiographic findings reviewed with patient    Impression:   Encounter Diagnosis   Name Primary? Sprain of other ligament of right ankle, initial encounter Yes       Plan:  Natural history and expected course discussed. Questions answered. Rest, ice, compression, elevation (RICE) therapy. Educational materials distributed. Home exercise plan outlined.     Continue current restrictions  Fu 2 months

## 2023-09-12 ENCOUNTER — HOSPITAL ENCOUNTER (OUTPATIENT)
Dept: ULTRASOUND IMAGING | Age: 33
Discharge: HOME | End: 2023-09-14
Payer: COMMERCIAL

## 2023-09-12 DIAGNOSIS — R16.0 HEPATOMEGALY: ICD-10-CM

## 2023-09-12 PROCEDURE — 76705 ECHO EXAM OF ABDOMEN: CPT

## 2023-09-12 PROCEDURE — 76981 USE PARENCHYMA: CPT

## 2023-10-03 ENCOUNTER — OFFICE VISIT (OUTPATIENT)
Dept: ORTHOPEDIC SURGERY | Age: 33
End: 2023-10-03
Payer: COMMERCIAL

## 2023-10-03 VITALS — BODY MASS INDEX: 37.19 KG/M2 | HEIGHT: 77 IN | WEIGHT: 315 LBS | TEMPERATURE: 98 F

## 2023-10-03 DIAGNOSIS — S93.491A SPRAIN OF OTHER LIGAMENT OF RIGHT ANKLE, INITIAL ENCOUNTER: Primary | ICD-10-CM

## 2023-10-03 PROCEDURE — 99213 OFFICE O/P EST LOW 20 MIN: CPT | Performed by: ORTHOPAEDIC SURGERY

## 2023-10-03 NOTE — PROGRESS NOTES
Chief Complaint   Patient presents with    Ankle Pain     Right ankle workers comp some days it feels really good and other days it gets a tight burning feeling. Evangelist Small returns today for follow-up of right ankle pain. He has finished PT. He does believe he is making progress but still gets some pain around the front of the foot. No past medical history on file. No past surgical history on file. Current Outpatient Medications:     losartan-hydroCHLOROthiazide (HYZAAR) 100-12.5 MG per tablet, TAKE 1 TABLET BY MOUTH EVERY DAY, Disp: , Rfl:     sertraline (ZOLOFT) 100 MG tablet, TAKE 1 TABLET BY MOUTH EVERY DAY, Disp: , Rfl:     celecoxib (CELEBREX) 200 MG capsule, Take 1 capsule by mouth daily, Disp: 30 capsule, Rfl: 3  No Known Allergies  Social History     Socioeconomic History    Marital status:      Spouse name: Not on file    Number of children: Not on file    Years of education: Not on file    Highest education level: Not on file   Occupational History    Not on file   Tobacco Use    Smoking status: Not on file    Smokeless tobacco: Not on file   Substance and Sexual Activity    Alcohol use: Not on file    Drug use: Not on file    Sexual activity: Not on file   Other Topics Concern    Not on file   Social History Narrative    Not on file     Social Determinants of Health     Financial Resource Strain: Not on file   Food Insecurity: Not on file   Transportation Needs: Not on file   Physical Activity: Sufficiently Active (2/28/2023)    Exercise Vital Sign     Days of Exercise per Week: 6 days     Minutes of Exercise per Session: 150+ min   Stress: Not on file   Social Connections: Not on file   Intimate Partner Violence: Not At Risk (2/28/2023)    Humiliation, Afraid, Rape, and Kick questionnaire     Fear of Current or Ex-Partner: No     Emotionally Abused: No     Physically Abused: No     Sexually Abused: No   Housing Stability: Not on file     No family history on file.     Review of

## 2023-10-30 DIAGNOSIS — S93.491A SPRAIN OF OTHER LIGAMENT OF RIGHT ANKLE, INITIAL ENCOUNTER: ICD-10-CM

## 2023-10-30 RX ORDER — CELECOXIB 200 MG/1
CAPSULE ORAL DAILY
Qty: 30 CAPSULE | Refills: 3 | Status: SHIPPED | OUTPATIENT
Start: 2023-10-30

## 2024-05-12 LAB
ALBUMIN: NORMAL
ALP BLD-CCNC: NORMAL U/L
ALT SERPL-CCNC: NORMAL U/L
ANION GAP SERPL CALCULATED.3IONS-SCNC: NORMAL MMOL/L
AST SERPL-CCNC: NORMAL U/L
BILIRUB SERPL-MCNC: NORMAL MG/DL
BUN BLDV-MCNC: NORMAL MG/DL
CALCIUM SERPL-MCNC: NORMAL MG/DL
CHLORIDE BLD-SCNC: NORMAL MMOL/L
CHOLESTEROL, TOTAL: NORMAL
CHOLESTEROL/HDL RATIO: NORMAL
CO2: NORMAL
CREAT SERPL-MCNC: NORMAL MG/DL
ESTIMATED AVERAGE GLUCOSE: NORMAL
GFR, ESTIMATED: NORMAL
GLUCOSE BLD-MCNC: NORMAL MG/DL
HBA1C MFR BLD: 5.7 %
HDLC SERPL-MCNC: NORMAL MG/DL
LDL CHOLESTEROL: NORMAL
NONHDLC SERPL-MCNC: NORMAL MG/DL
POTASSIUM SERPL-SCNC: NORMAL MMOL/L
SODIUM BLD-SCNC: NORMAL MMOL/L
TOTAL PROTEIN: NORMAL
TRIGL SERPL-MCNC: NORMAL MG/DL
VLDLC SERPL CALC-MCNC: NORMAL MG/DL

## 2025-07-26 SDOH — HEALTH STABILITY: PHYSICAL HEALTH: ON AVERAGE, HOW MANY DAYS PER WEEK DO YOU ENGAGE IN MODERATE TO STRENUOUS EXERCISE (LIKE A BRISK WALK)?: 5 DAYS

## 2025-07-26 SDOH — HEALTH STABILITY: PHYSICAL HEALTH: ON AVERAGE, HOW MANY MINUTES DO YOU ENGAGE IN EXERCISE AT THIS LEVEL?: 80 MIN

## 2025-07-26 ASSESSMENT — ANXIETY QUESTIONNAIRES
5. BEING SO RESTLESS THAT IT IS HARD TO SIT STILL: MORE THAN HALF THE DAYS
4. TROUBLE RELAXING: SEVERAL DAYS
2. NOT BEING ABLE TO STOP OR CONTROL WORRYING: SEVERAL DAYS
7. FEELING AFRAID AS IF SOMETHING AWFUL MIGHT HAPPEN: SEVERAL DAYS
GAD7 TOTAL SCORE: 8
6. BECOMING EASILY ANNOYED OR IRRITABLE: SEVERAL DAYS
2. NOT BEING ABLE TO STOP OR CONTROL WORRYING: SEVERAL DAYS
3. WORRYING TOO MUCH ABOUT DIFFERENT THINGS: SEVERAL DAYS
IF YOU CHECKED OFF ANY PROBLEMS ON THIS QUESTIONNAIRE, HOW DIFFICULT HAVE THESE PROBLEMS MADE IT FOR YOU TO DO YOUR WORK, TAKE CARE OF THINGS AT HOME, OR GET ALONG WITH OTHER PEOPLE: VERY DIFFICULT
7. FEELING AFRAID AS IF SOMETHING AWFUL MIGHT HAPPEN: SEVERAL DAYS
1. FEELING NERVOUS, ANXIOUS, OR ON EDGE: SEVERAL DAYS
5. BEING SO RESTLESS THAT IT IS HARD TO SIT STILL: MORE THAN HALF THE DAYS
3. WORRYING TOO MUCH ABOUT DIFFERENT THINGS: SEVERAL DAYS
IF YOU CHECKED OFF ANY PROBLEMS ON THIS QUESTIONNAIRE, HOW DIFFICULT HAVE THESE PROBLEMS MADE IT FOR YOU TO DO YOUR WORK, TAKE CARE OF THINGS AT HOME, OR GET ALONG WITH OTHER PEOPLE: VERY DIFFICULT
4. TROUBLE RELAXING: SEVERAL DAYS
1. FEELING NERVOUS, ANXIOUS, OR ON EDGE: SEVERAL DAYS
6. BECOMING EASILY ANNOYED OR IRRITABLE: SEVERAL DAYS

## 2025-07-26 ASSESSMENT — PATIENT HEALTH QUESTIONNAIRE - PHQ9
3. TROUBLE FALLING OR STAYING ASLEEP: SEVERAL DAYS
2. FEELING DOWN, DEPRESSED OR HOPELESS: SEVERAL DAYS
SUM OF ALL RESPONSES TO PHQ QUESTIONS 1-9: 9
6. FEELING BAD ABOUT YOURSELF - OR THAT YOU ARE A FAILURE OR HAVE LET YOURSELF OR YOUR FAMILY DOWN: SEVERAL DAYS
8. MOVING OR SPEAKING SO SLOWLY THAT OTHER PEOPLE COULD HAVE NOTICED. OR THE OPPOSITE, BEING SO FIGETY OR RESTLESS THAT YOU HAVE BEEN MOVING AROUND A LOT MORE THAN USUAL: SEVERAL DAYS
7. TROUBLE CONCENTRATING ON THINGS, SUCH AS READING THE NEWSPAPER OR WATCHING TELEVISION: SEVERAL DAYS
9. THOUGHTS THAT YOU WOULD BE BETTER OFF DEAD, OR OF HURTING YOURSELF: NOT AT ALL
SUM OF ALL RESPONSES TO PHQ QUESTIONS 1-9: 9
10. IF YOU CHECKED OFF ANY PROBLEMS, HOW DIFFICULT HAVE THESE PROBLEMS MADE IT FOR YOU TO DO YOUR WORK, TAKE CARE OF THINGS AT HOME, OR GET ALONG WITH OTHER PEOPLE: SOMEWHAT DIFFICULT
2. FEELING DOWN, DEPRESSED OR HOPELESS: SEVERAL DAYS
1. LITTLE INTEREST OR PLEASURE IN DOING THINGS: SEVERAL DAYS
1. LITTLE INTEREST OR PLEASURE IN DOING THINGS: SEVERAL DAYS
9. THOUGHTS THAT YOU WOULD BE BETTER OFF DEAD, OR OF HURTING YOURSELF: NOT AT ALL
5. POOR APPETITE OR OVEREATING: SEVERAL DAYS
SUM OF ALL RESPONSES TO PHQ QUESTIONS 1-9: 9
6. FEELING BAD ABOUT YOURSELF - OR THAT YOU ARE A FAILURE OR HAVE LET YOURSELF OR YOUR FAMILY DOWN: SEVERAL DAYS
SUM OF ALL RESPONSES TO PHQ QUESTIONS 1-9: 9
10. IF YOU CHECKED OFF ANY PROBLEMS, HOW DIFFICULT HAVE THESE PROBLEMS MADE IT FOR YOU TO DO YOUR WORK, TAKE CARE OF THINGS AT HOME, OR GET ALONG WITH OTHER PEOPLE: SOMEWHAT DIFFICULT
7. TROUBLE CONCENTRATING ON THINGS, SUCH AS READING THE NEWSPAPER OR WATCHING TELEVISION: SEVERAL DAYS
4. FEELING TIRED OR HAVING LITTLE ENERGY: MORE THAN HALF THE DAYS
4. FEELING TIRED OR HAVING LITTLE ENERGY: MORE THAN HALF THE DAYS
8. MOVING OR SPEAKING SO SLOWLY THAT OTHER PEOPLE COULD HAVE NOTICED. OR THE OPPOSITE - BEING SO FIDGETY OR RESTLESS THAT YOU HAVE BEEN MOVING AROUND A LOT MORE THAN USUAL: SEVERAL DAYS
SUM OF ALL RESPONSES TO PHQ QUESTIONS 1-9: 9
3. TROUBLE FALLING OR STAYING ASLEEP: SEVERAL DAYS
5. POOR APPETITE OR OVEREATING: SEVERAL DAYS

## 2025-07-29 ENCOUNTER — OFFICE VISIT (OUTPATIENT)
Age: 35
End: 2025-07-29
Payer: COMMERCIAL

## 2025-07-29 VITALS
HEART RATE: 81 BPM | HEIGHT: 77 IN | WEIGHT: 315 LBS | TEMPERATURE: 98.1 F | OXYGEN SATURATION: 98 % | DIASTOLIC BLOOD PRESSURE: 73 MMHG | SYSTOLIC BLOOD PRESSURE: 136 MMHG | BODY MASS INDEX: 37.19 KG/M2 | RESPIRATION RATE: 16 BRPM

## 2025-07-29 DIAGNOSIS — I10 BENIGN HYPERTENSION: Primary | ICD-10-CM

## 2025-07-29 DIAGNOSIS — Z82.49 FAMILY HISTORY OF EARLY CAD: ICD-10-CM

## 2025-07-29 PROCEDURE — 3075F SYST BP GE 130 - 139MM HG: CPT | Performed by: FAMILY MEDICINE

## 2025-07-29 PROCEDURE — 99204 OFFICE O/P NEW MOD 45 MIN: CPT | Performed by: FAMILY MEDICINE

## 2025-07-29 PROCEDURE — 3078F DIAST BP <80 MM HG: CPT | Performed by: FAMILY MEDICINE

## 2025-07-29 RX ORDER — LOSARTAN POTASSIUM AND HYDROCHLOROTHIAZIDE 12.5; 1 MG/1; MG/1
1 TABLET ORAL DAILY
Qty: 90 TABLET | Refills: 3 | Status: SHIPPED | OUTPATIENT
Start: 2025-07-29

## 2025-07-29 SDOH — ECONOMIC STABILITY: FOOD INSECURITY: WITHIN THE PAST 12 MONTHS, THE FOOD YOU BOUGHT JUST DIDN'T LAST AND YOU DIDN'T HAVE MONEY TO GET MORE.: NEVER TRUE

## 2025-07-29 SDOH — ECONOMIC STABILITY: FOOD INSECURITY: WITHIN THE PAST 12 MONTHS, YOU WORRIED THAT YOUR FOOD WOULD RUN OUT BEFORE YOU GOT MONEY TO BUY MORE.: NEVER TRUE

## 2025-07-29 NOTE — PROGRESS NOTES
Phillip Nunez (:  1990) is a 35 y.o. male,Established patient, here for evaluation of the following chief complaint(s):  New Patient (Pt is new pt wanting to establish with PCP.  Pt states doing ok, no new complaints.  )         Assessment & Plan  Benign hypertension   Chronic, at goal (stable), continue current treatment plan, medication adherence emphasized, and lifestyle modifications recommended  Continue current losartan HCT prescription given and labs ordered  Orders:    losartan-hydroCHLOROthiazide (HYZAAR) 100-12.5 MG per tablet; Take 1 tablet by mouth daily    Comprehensive Metabolic Panel, Fasting; Future    CBC with Auto Differential; Future    Lipid, Fasting; Future    BMI 40.0-44.9, adult (HCC)   Chronic, not at goal (unstable), changes made today: Dieting and exercising reviewed indeed         Family history of early CAD   Reviewed with him in detail, I went over risk modification           No follow-ups on file.     Labs will be done as an outpatient I recommended exercise program dietary measures low-cholesterol diet getting weight off.  This will help his cardiac risk factors of family history of coronary disease hypertension and BMI I will see him back in 6 months I will call him with his  Subjective   HPI:  Patient comes in today as a new patient he is healthy 35-year-old gentleman has had high blood pressure for about the last 4 to 5 years he has a strong family history of coronary disease his dad  this year of a coronary event he had previous bypass surgery he was 75 mom has not LifeVest on right now for congestive heart failure and she has coronary artery disease.  He quit smoking in  he is only had surgery on his nose with nasal polyps x 2 he is compliant with his medication he has not had his any labs done for about a year he has no chest pain no shortness of breath no dizziness no headache no bowel or bladder dysfunction.  Past medical surgical social family history

## 2025-09-06 ENCOUNTER — HOSPITAL ENCOUNTER (OUTPATIENT)
Dept: LAB | Age: 35
Discharge: HOME | End: 2025-09-06
Payer: COMMERCIAL

## 2025-09-06 DIAGNOSIS — I10 BENIGN HYPERTENSION: ICD-10-CM

## 2025-09-06 LAB
ALBUMIN SERPL-MCNC: 4.4 G/DL (ref 3.5–5.2)
ALP SERPL-CCNC: 86 U/L (ref 40–129)
ALT SERPL-CCNC: 33 U/L (ref 0–50)
ANION GAP SERPL CALCULATED.3IONS-SCNC: 12 MMOL/L (ref 7–16)
AST SERPL-CCNC: 13 U/L (ref 0–50)
BASOPHILS # BLD: 0.06 K/UL (ref 0–0.2)
BASOPHILS NFR BLD: 1 % (ref 0–2)
BILIRUB SERPL-MCNC: 0.8 MG/DL (ref 0–1.2)
BUN SERPL-MCNC: 12 MG/DL (ref 6–20)
CALCIUM SERPL-MCNC: 9.8 MG/DL (ref 8.6–10)
CHLORIDE SERPL-SCNC: 105 MMOL/L (ref 98–107)
CHOLEST SERPL-MCNC: 135 MG/DL
CO2 SERPL-SCNC: 26 MMOL/L (ref 22–29)
CREAT SERPL-MCNC: 0.8 MG/DL (ref 0.7–1.2)
EOSINOPHIL # BLD: 0.29 K/UL (ref 0.05–0.5)
EOSINOPHILS RELATIVE PERCENT: 4 % (ref 0–6)
ERYTHROCYTE [DISTWIDTH] IN BLOOD BY AUTOMATED COUNT: 13.2 % (ref 11.5–15)
GFR, ESTIMATED: >90 ML/MIN/1.73M2
GLUCOSE P FAST SERPL-MCNC: 98 MG/DL (ref 74–99)
HCT VFR BLD AUTO: 45.1 % (ref 37–54)
HDLC SERPL-MCNC: 38 MG/DL
HGB BLD-MCNC: 15.3 G/DL (ref 12.5–16.5)
IMM GRANULOCYTES # BLD AUTO: <0.03 K/UL (ref 0–0.58)
IMM GRANULOCYTES NFR BLD: 0 % (ref 0–5)
LDLC SERPL CALC-MCNC: 75 MG/DL
LYMPHOCYTES NFR BLD: 2.35 K/UL (ref 1.5–4)
LYMPHOCYTES RELATIVE PERCENT: 33 % (ref 20–42)
MCH RBC QN AUTO: 29.1 PG (ref 26–35)
MCHC RBC AUTO-ENTMCNC: 33.9 G/DL (ref 32–34.5)
MCV RBC AUTO: 85.7 FL (ref 80–99.9)
MONOCYTES NFR BLD: 0.69 K/UL (ref 0.1–0.95)
MONOCYTES NFR BLD: 10 % (ref 2–12)
NEUTROPHILS NFR BLD: 53 % (ref 43–80)
NEUTS SEG NFR BLD: 3.82 K/UL (ref 1.8–7.3)
PLATELET # BLD AUTO: 259 K/UL (ref 130–450)
PMV BLD AUTO: 10.8 FL (ref 7–12)
POTASSIUM SERPL-SCNC: 4 MMOL/L (ref 3.5–5.1)
PROT SERPL-MCNC: 7.6 G/DL (ref 6.4–8.3)
RBC # BLD AUTO: 5.26 M/UL (ref 3.8–5.8)
SODIUM SERPL-SCNC: 142 MMOL/L (ref 136–145)
TRIGL SERPL-MCNC: 108 MG/DL
VLDLC SERPL CALC-MCNC: 22 MG/DL
WBC OTHER # BLD: 7.2 K/UL (ref 4.5–11.5)

## 2025-09-06 PROCEDURE — 80061 LIPID PANEL: CPT

## 2025-09-06 PROCEDURE — 85025 COMPLETE CBC W/AUTO DIFF WBC: CPT

## 2025-09-06 PROCEDURE — 80053 COMPREHEN METABOLIC PANEL: CPT
